# Patient Record
Sex: MALE | Race: BLACK OR AFRICAN AMERICAN
[De-identification: names, ages, dates, MRNs, and addresses within clinical notes are randomized per-mention and may not be internally consistent; named-entity substitution may affect disease eponyms.]

---

## 2017-05-11 ENCOUNTER — HOSPITAL ENCOUNTER (EMERGENCY)
Dept: HOSPITAL 17 - NEPK | Age: 57
Discharge: HOME | End: 2017-05-11
Payer: COMMERCIAL

## 2017-05-11 VITALS — BODY MASS INDEX: 27.4 KG/M2 | WEIGHT: 180.78 LBS | HEIGHT: 68 IN

## 2017-05-11 VITALS
DIASTOLIC BLOOD PRESSURE: 83 MMHG | TEMPERATURE: 99 F | RESPIRATION RATE: 20 BRPM | SYSTOLIC BLOOD PRESSURE: 140 MMHG | OXYGEN SATURATION: 97 % | HEART RATE: 84 BPM

## 2017-05-11 DIAGNOSIS — F17.210: ICD-10-CM
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
## 2017-05-11 DIAGNOSIS — S61.411A: Primary | ICD-10-CM
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
## 2017-05-11 PROCEDURE — 12001 RPR S/N/AX/GEN/TRNK 2.5CM/<: CPT

## 2017-05-11 NOTE — PD
HPI


Chief Complaint:  Laceration/Skin Injury


Time Seen by Provider:  14:40


Travel History


International Travel<30 days:  No


Contact w/Intl Traveler<30days:  No


Traveled to known affect area:  No





History of Present Illness


HPI


56-year-old male presents emergency Department with complaint of a laceration 

to the palmar aspect of his right hand, below the second digit, from mikayla wire.

  Reports being up-to-date on his tetanus vaccination.  Denies paresthesias, 

loss of sensation, decreased range of motion, decreased strength to all fingers 

and the hand.  Has not taken any medications to alleviate his symptoms.  Has 

applied pressure to control bleeding.  Has no other medical complaints.  No 

known allergies.  No other modifying factors or associated signs and symptoms.





PFSH


Past Medical History


Hepatitis:  Yes (B)





Past Surgical History


Body Medical Devices:  HEP B





Social History


Alcohol Use:  No


Tobacco Use:  Yes (1 PPD)


Substance Use:  No





Allergies-Medications


(Allergen,Severity, Reaction):  


Coded Allergies:  


     No Known Allergies (Verified , 5/11/17)


Reported Meds & Prescriptions





Reported Meds & Active Scripts


Active


Ibuprofen 800 Mg Tab 800 Mg PO Q6HR PRN


Keflex (Cephalexin) 500 Mg Cap 500 Mg PO Q8H 7 Days








Review of Systems


Except as stated in HPI:  all other systems reviewed are Neg





Physical Exam


Narrative


GENERAL: Well-nourished, well-developed  male patient, in no 

acute distress


SKIN: Warm and dry.  Palmar aspect of right hand with approximately 1.5 cm 

laceration over the area of the second metatarsal; without erythema, edema; 

moderate amount of bright red drainage.  All fingers with full range of motion 

and sensory intact.  Second finger with good opposition.  


HEAD: Atraumatic. Normocephalic. 


EYES: Pupils equal and round. No scleral icterus. No injection or drainage.


ENT: Mucosa pink and moist.  Airway patent.  


NECK: Trachea midline.  


CARDIOVASCULAR: Regular rate.  


RESPIRATORY: No accessory muscle use.


GASTROINTESTINAL: Flat.


MUSCULOSKELETAL:  No obvious deformities. No clubbing.  No cyanosis.  No edema. 


NEUROLOGICAL: Awake and alert.  Oriented 3.  No obvious cranial nerve 

deficits.  Motor grossly within normal limits. Normal speech. 


PSYCHIATRIC: Appropriate mood and affect; insight and judgment normal.





Data


Data


Last Documented VS





Vital Signs








  Date Time  Temp Pulse Resp B/P Pulse Ox O2 Delivery O2 Flow Rate FiO2


 


5/11/17 14:11 99.0 84 20 140/83 97 Room Air  








Orders





 Lidocaine 1% Inj (50 Ml) (Xylocaine 1% I (5/11/17 14:45)








MDM


Medical Decision Making


Medical Screen Exam Complete:  Yes


Emergency Medical Condition:  Yes


Medical Record Reviewed:  Yes


Differential Diagnosis


Laceration, contusion, abrasion


Narrative Course


56-year-old male with laceration to the palmar aspect of the right hand to the 

second metacarpal area.  See my procedure note for laceration repair.  Patient 

states on his tetanus vaccination.  Keflex and ibuprofen prescribed for home.  

Instructed patient to return to the emergency department or follow-up with 

primary care provider in 7-10 days for suture removal.  Patient verbalizes 

understanding and agreement with treatment plan.  Patient is medically cleared 

and stable for discharge.  Discussed reasons to return to the emergency 

department.  Instructed patient to follow up with primary care provider.  

Patient agrees with treatment plan.  The patients vital signs are stable and 

the patient is stable for outpatient follow-up and treatment.  Patient 

discharged home, stable and in no acute distress.





Procedures


**Procedure Narrative**


LACERATION


LOCATION: Flores aspect of right hand over the second metacarpal area


LENGTH: 1.5 cm


NUMBER OF STITCHES/STAPLES: 5 sutures





REPAIR: The area of the laceration was prepped with Betadine and sterilely 

draped.  The laceration was infiltrated with  


1% lidocaine with epinephrine.  The wound was copiously irrigated and explored 

without evidence of foreign body, tendon injury or neurovascular  


injury.  The wound was closed using 4-0 Prolene. This was a single layer 

repair. A sterile dressing was applied. The patient was  


advised to keep the dressing clean and dry. Patient tolerated the procedure 

well.





Diagnosis





 Primary Impression:  


 Laceration of right hand


 Qualified Code:  S61.411A - Laceration of right hand, foreign body presence 

unspecified, initial encounter


Referrals:  


Primary Care Physician


Patient Instructions:  Acute Wound Care (ED), Care For Your Stitches (ED), 

General Instructions


Departure Forms:  Tests/Procedures, Work Release   Enter return to work date:  

May 15, 2017





***Additional Instructions:


Antibiotics as prescribed


Refer to acute wound care instructions for wound care


Keep area clean and dry


Limit right hand activity to decrease risk of sutures coming undone


Ibuprofen every 6 hours as directed and as needed for pain


Ice pack to area as needed to decrease pain


Return to the emergency department or follow-up with  primary care provider in 7

-10 days for suture removal


Follow up with primary care provider within 2-4 days


Return to the emergency department immediately with worsening of symptoms, 

particularly if reddened streaks up or down the affected extremity from the 

suture site, fever, numbness/tingling in the affected extremity, loss of 

sensation in the affected extremity, severe swelling of the affected


***Med/Other Pt SpecificInfo:  Prescription(s) given


Scripts


Ibuprofen 800 Mg [id number] Mg PO Q6HR PRN (PAIN) #30 TAB  Ref 0


   Prov:Jaimie Canchola         5/11/17 


Cephalexin (Keflex)500 Mg Gfe125 Mg PO Q8H  7 Days  Ref 0


   Prov:Jaimie Canchola         5/11/17


Disposition:  01 DISCHARGE HOME


Condition:  Stable








Jaimie Canchola May 11, 2017 14:41

## 2018-02-17 ENCOUNTER — HOSPITAL ENCOUNTER (EMERGENCY)
Dept: HOSPITAL 17 - NEPE | Age: 58
Discharge: HOME | End: 2018-02-17
Payer: COMMERCIAL

## 2018-02-17 VITALS
HEART RATE: 57 BPM | DIASTOLIC BLOOD PRESSURE: 73 MMHG | OXYGEN SATURATION: 100 % | SYSTOLIC BLOOD PRESSURE: 153 MMHG | RESPIRATION RATE: 18 BRPM

## 2018-02-17 VITALS
DIASTOLIC BLOOD PRESSURE: 76 MMHG | RESPIRATION RATE: 16 BRPM | OXYGEN SATURATION: 99 % | SYSTOLIC BLOOD PRESSURE: 137 MMHG | TEMPERATURE: 97.3 F | HEART RATE: 61 BPM

## 2018-02-17 VITALS — HEIGHT: 69 IN | WEIGHT: 185.19 LBS | BODY MASS INDEX: 27.43 KG/M2

## 2018-02-17 VITALS — DIASTOLIC BLOOD PRESSURE: 71 MMHG | SYSTOLIC BLOOD PRESSURE: 150 MMHG

## 2018-02-17 DIAGNOSIS — Z86.19: ICD-10-CM

## 2018-02-17 DIAGNOSIS — Z79.899: ICD-10-CM

## 2018-02-17 DIAGNOSIS — R05: ICD-10-CM

## 2018-02-17 DIAGNOSIS — B02.9: Primary | ICD-10-CM

## 2018-02-17 DIAGNOSIS — F17.200: ICD-10-CM

## 2018-02-17 DIAGNOSIS — R07.9: ICD-10-CM

## 2018-02-17 PROCEDURE — 71045 X-RAY EXAM CHEST 1 VIEW: CPT

## 2018-02-17 PROCEDURE — 99283 EMERGENCY DEPT VISIT LOW MDM: CPT

## 2018-02-17 NOTE — PD
HPI


Chief Complaint:  Abdominal Pain


Time Seen by Provider:  06:54


Travel History


International Travel<30 days:  No


Contact w/Intl Traveler<30days:  No


Traveled to known affect area:  No





History of Present Illness


HPI


The patient is a 57-year-old after American male who presents to the emergency 

department for right sided chest pain, cough, and burning sensation with rash.  

The patient's symptoms started earlier this week, he then developed a rash 

which is fascicular in the right side of the chest wall, described as burning 

and painful.  He also notes a productive cough producing green sputum, does 

have a history of tobacco use.  Denies a fever, chills, or sweats.  He denies 

any nausea, vomiting, diarrhea, or lower abdominal pain.  Symptoms are 

moderate.  He denies any history of shingles.  He has been taking over-the-

counter ibuprofen for pain.





Formerly [facility], later [facility]


Past Medical History


Medical History:  Denies Significant Hx


Hepatitis:  Yes (B)





Past Surgical History


Body Medical Devices:  HEP B


Eye Surgery:  Yes (cyst removed)





Social History


Alcohol Use:  Yes (daily)


Tobacco Use:  Yes (1 PPD)


Substance Use:  No





Allergies-Medications


(Allergen,Severity, Reaction):  


Coded Allergies:  


     No Known Allergies (Verified , 5/11/17)


Reported Meds & Prescriptions





Reported Meds & Active Scripts


Active


Norco (Hydrocodone-Acetaminophen) 5 Mg-325 Mg Tab 1 Tab PO Q6H PRN


Acyclovir 800 Mg Tab 800 Mg PO 5 TIMES A DAY 10 Days


Prednisone 20 Mg Tab 40 Mg PO DAILY 5 Days


     Take 40 mg (2 tablets) daily for 5 days


Ibuprofen 800 Mg Tab 800 Mg PO Q6HR PRN








Review of Systems


Except as stated in HPI:  all other systems reviewed are Neg


General / Constitutional:  No: Fever


Cardiovascular:  Positive: Chest Pain or Discomfort


Respiratory:  Positive: Cough, No: Shortness of Breath


Gastrointestinal:  No: Nausea, Vomiting, Abdominal Pain


Skin:  Positive Rash





Physical Exam


Narrative


GENERAL: Awake, alert, pleasant 57-year-old male who appears his stated age and 

is in no acute respiratory distress.


SKIN: Focused skin assessment warm/dry.  Patient has a vesicular rash in a 

dermatomal fashion in the lower thoracic upper abdominal area that is on the 

right side, starts just lateral to midline in the right back and stops midline 

in the anterior aspect of the abdomen.


HEAD: Atraumatic. Normocephalic. 


EYES: Pupils equal and round. No scleral icterus. No injection or drainage. 


ENT: No nasal bleeding or discharge.  Mucous membranes pink and moist.


NECK: Trachea midline. No JVD. 


CARDIOVASCULAR: Regular rate and rhythm.  No murmur appreciated.


RESPIRATORY: No accessory muscle use. Clear to auscultation. Breath sounds 

equal bilaterally. 


GASTROINTESTINAL: Abdomen soft, non-tender, nondistended.  No rebound 

tenderness.  No guarding or rigidity.


MUSCULOSKELETAL: No obvious deformities. No clubbing.  No cyanosis.  No edema. 


NEUROLOGICAL: Awake and alert. No obvious cranial nerve deficits.  Motor 

grossly within normal limits. Normal speech.


PSYCHIATRIC: Appropriate mood and affect; insight and judgment normal.





Data


Data


Last Documented VS





Vital Signs








  Date Time  Temp Pulse Resp B/P (MAP) Pulse Ox O2 Delivery O2 Flow Rate FiO2


 


2/17/18 06:24  57 18 153/73 (99) 100   


 


2/17/18 05:59 97.3       








Orders





 Orders


Chest, Single Ap (2/17/18 )


Acetamin-Hydrocod 325-5 Mg (Norco  5-325 (2/17/18 07:00)


Acyclovir (Zovirax) (2/17/18 07:00)








[facility]


Medical Decision Making


Medical Screen Exam Complete:  Yes


Emergency Medical Condition:  Yes


Medical Record Reviewed:  Yes


Interpretation(s)





Last Impressions








Chest X-Ray 2/17/18 0000 Signed





Impressions: 





 Service Date/Time:  Saturday, February 17, 2018 07:17 - CONCLUSION:  No acute 





 disease.  No significant change has occurred.       Ashu Forrest MD 








Differential Diagnosis


Differential diagnosis includes bronchitis, pneumonia, pleural effusion, 

shingles, cholecystitis, peptic ulcer disease, pancreatitis.


Narrative Course


Chest x-ray was obtained for productive cough and right sided chest pain.  

However, patient has vesicular rash in the right lower chest wall, upper 

abdominal area that is vesicular, consistent with shingles.  The patient was 

provided pain medication, Norco 5 mg orally while chest x-ray was pending.  

Chest x-ray is negative.  The patient will be treated for shingles with 

prednisone, acyclovir, and pain medication.  He is advised to follow-up with 

his primary physician.





Diagnosis





 Primary Impression:  


 Shingles


 Qualified Codes:  B02.9 - Zoster without complications


Patient Instructions:  General Instructions





***Additional Instructions:  


Medications as directed.  Follow-up with her primary physician.  Stop smoking.  

Return if symptoms worsen or progress.


***Med/Other Pt SpecificInfo:  Prescription(s) given


Scripts


Hydrocodone-Acetaminophen (Norco) 5 Mg-325 Mg Tab


1 TAB PO Q6H Y for PAIN, #15 TAB 0 Refills


   Prov: Oniel Delcid MD         2/17/18 


Acyclovir (Acyclovir) 800 Mg Tab


800 MG PO 5 TIMES A DAY for Mgmt Viral Infection for 10 Days, TAB 0 Refills


   Prov: Oniel Delcid MD         2/17/18 


Prednisone (Prednisone) 20 Mg Tab


40 MG PO DAILY for 5 Days, #10 TAB 0 Refills


   Take 40 mg (2 tablets) daily for 5 days


   Prov: Oniel Delcid MD         2/17/18


Disposition:  01 DISCHARGE HOME


Condition:  Stable











Oniel Delcid MD Feb 17, 2018 07:05

## 2018-02-17 NOTE — RADRPT
EXAM DATE/TIME:  02/17/2018 07:17 

 

[facility] COMPARISON:     

CHEST SINGLE AP, June 23, 2016, 8:47.

 

                     

INDICATIONS :     

Cough.

                     

 

MEDICAL HISTORY :     

None.          

 

SURGICAL HISTORY :     

None.   

 

ENCOUNTER:     

Initial                                        

 

ACUITY:     

1 day      

 

PAIN SCORE:     

0/10

 

LOCATION:      

chest 

 

FINDINGS:     

A single view of the chest demonstrates the lungs to be symmetrically aerated without evidence of mas
s, infiltrate or effusion.  The cardiomediastinal contours are unremarkable.  Osseous structures are 
intact.

 

CONCLUSION:     

No acute disease.  No significant change has occurred.  

 

 

 

 Ashu Forrest MD on February 17, 2018 at 7:34           

Board Certified Radiologist.

 This report was verified electronically.